# Patient Record
Sex: MALE | Race: WHITE | NOT HISPANIC OR LATINO | Employment: FULL TIME | URBAN - METROPOLITAN AREA
[De-identification: names, ages, dates, MRNs, and addresses within clinical notes are randomized per-mention and may not be internally consistent; named-entity substitution may affect disease eponyms.]

---

## 2019-05-09 ENCOUNTER — OFFICE VISIT (OUTPATIENT)
Dept: MEDICAL GROUP | Facility: PHYSICIAN GROUP | Age: 59
End: 2019-05-09
Payer: COMMERCIAL

## 2019-05-09 VITALS
DIASTOLIC BLOOD PRESSURE: 70 MMHG | SYSTOLIC BLOOD PRESSURE: 120 MMHG | HEIGHT: 68 IN | TEMPERATURE: 98.1 F | BODY MASS INDEX: 24.86 KG/M2 | OXYGEN SATURATION: 96 % | WEIGHT: 164 LBS | HEART RATE: 56 BPM

## 2019-05-09 DIAGNOSIS — M17.11 PRIMARY OSTEOARTHRITIS OF RIGHT KNEE: ICD-10-CM

## 2019-05-09 PROCEDURE — 20610 DRAIN/INJ JOINT/BURSA W/O US: CPT | Performed by: FAMILY MEDICINE

## 2019-05-09 RX ORDER — TRIAMCINOLONE ACETONIDE 40 MG/ML
80 INJECTION, SUSPENSION INTRA-ARTICULAR; INTRAMUSCULAR ONCE
Status: COMPLETED | OUTPATIENT
Start: 2019-05-09 | End: 2019-05-09

## 2019-05-09 RX ADMIN — TRIAMCINOLONE ACETONIDE 80 MG: 40 INJECTION, SUSPENSION INTRA-ARTICULAR; INTRAMUSCULAR at 17:24

## 2019-05-09 ASSESSMENT — PATIENT HEALTH QUESTIONNAIRE - PHQ9: CLINICAL INTERPRETATION OF PHQ2 SCORE: 0

## 2019-05-09 ASSESSMENT — PAIN SCALES - GENERAL: PAINLEVEL: 8=MODERATE-SEVERE PAIN

## 2019-05-09 NOTE — LETTER
[facility]  Mac Avila M.D.  [address]  [place] [address]  Fax: [phone]   Authorization for Release/Disclosure of   Protected Health Information   Name: VANCE PÉREZ : 1960 SSN: [id number]   Address: [address]  [place] [address] Phone:    [phone] (home)    I authorize the entity listed below to release/disclose the PHI below to:   [facility]/Mac Avila M.D. and Mac Avila M.D.   Provider or Entity Name:   Address   City, State, Zip   Phone:      Fax:     Reason for request: continuity of care   Information to be released:    [  ] LAST COLONOSCOPY,  including any PATH REPORT and follow-up  [  ] LAST FIT/COLOGUARD RESULT [  ] LAST DEXA  [  ] LAST MAMMOGRAM  [  ] LAST PAP  [  ] LAST LABS [  ] RETINA EXAM REPORT  [  ] IMMUNIZATION RECORDS  [  ] Release all info      [  ] Check here and initial the line next to each item to release ALL health information INCLUDING  _____ Care and treatment for drug and / or alcohol abuse  _____ HIV testing, infection status, or AIDS  _____ Genetic Testing    DATES OF SERVICE OR TIME PERIOD TO BE DISCLOSED: _____________  I understand and acknowledge that:  * This Authorization may be revoked at any time by you in writing, except if your health information has already been used or disclosed.  * Your health information that will be used or disclosed as a result of you signing this authorization could be re-disclosed by the recipient. If this occurs, your re-disclosed health information may no longer be protected by State or Federal laws.  * You may refuse to sign this Authorization. Your refusal will not affect your ability to obtain treatment.  * This Authorization becomes effective upon signing and will  on (date) __________.      If no date is indicated, this Authorization will  one (1) year from the signature date.    Name: Vance Pérez    Signature:   Date:     2019       PLEASE FAX REQUESTED RECORDS BACK TO:  [phone]
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
## 2019-09-13 ENCOUNTER — OFFICE VISIT (OUTPATIENT)
Dept: MEDICAL GROUP | Facility: PHYSICIAN GROUP | Age: 59
End: 2019-09-13
Payer: COMMERCIAL

## 2019-09-13 ENCOUNTER — HOSPITAL ENCOUNTER (OUTPATIENT)
Dept: RADIOLOGY | Facility: MEDICAL CENTER | Age: 59
End: 2019-09-13
Attending: FAMILY MEDICINE
Payer: COMMERCIAL

## 2019-09-13 VITALS
SYSTOLIC BLOOD PRESSURE: 128 MMHG | BODY MASS INDEX: 24.55 KG/M2 | OXYGEN SATURATION: 99 % | DIASTOLIC BLOOD PRESSURE: 70 MMHG | HEART RATE: 61 BPM | WEIGHT: 162 LBS | RESPIRATION RATE: 14 BRPM | TEMPERATURE: 98 F | HEIGHT: 68 IN

## 2019-09-13 DIAGNOSIS — M25.511 ACUTE PAIN OF RIGHT SHOULDER: ICD-10-CM

## 2019-09-13 PROCEDURE — 73030 X-RAY EXAM OF SHOULDER: CPT | Mod: RT

## 2019-09-13 PROCEDURE — 99214 OFFICE O/P EST MOD 30 MIN: CPT | Performed by: FAMILY MEDICINE
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
## 2019-09-25 ENCOUNTER — HOSPITAL ENCOUNTER (OUTPATIENT)
Dept: RADIOLOGY | Facility: MEDICAL CENTER | Age: 59
End: 2019-09-25
Attending: FAMILY MEDICINE
Payer: COMMERCIAL

## 2019-09-25 DIAGNOSIS — M25.511 ACUTE PAIN OF RIGHT SHOULDER: ICD-10-CM

## 2019-09-25 PROCEDURE — 73221 MRI JOINT UPR EXTREM W/O DYE: CPT | Mod: RT

## 2019-09-26 ENCOUNTER — TELEPHONE (OUTPATIENT)
Dept: MEDICAL GROUP | Facility: PHYSICIAN GROUP | Age: 59
End: 2019-09-26

## 2019-09-26 DIAGNOSIS — S43.431A TEAR OF RIGHT GLENOID LABRUM, INITIAL ENCOUNTER: ICD-10-CM

## 2019-09-26 DIAGNOSIS — S43.432A TEAR OF LEFT GLENOID LABRUM, INITIAL ENCOUNTER: ICD-10-CM

## 2019-09-26 DIAGNOSIS — M12.811 ROTATOR CUFF TEAR ARTHROPATHY OF RIGHT SHOULDER: ICD-10-CM

## 2019-09-26 DIAGNOSIS — M75.101 ROTATOR CUFF TEAR ARTHROPATHY OF RIGHT SHOULDER: ICD-10-CM

## 2020-04-10 ENCOUNTER — APPOINTMENT (OUTPATIENT)
Dept: RADIOLOGY | Facility: IMAGING CENTER | Age: 60
End: 2020-04-10
Attending: NURSE PRACTITIONER
Payer: COMMERCIAL

## 2020-04-10 ENCOUNTER — OFFICE VISIT (OUTPATIENT)
Dept: URGENT CARE | Facility: CLINIC | Age: 60
End: 2020-04-10
Payer: COMMERCIAL

## 2020-04-10 VITALS
OXYGEN SATURATION: 98 % | DIASTOLIC BLOOD PRESSURE: 76 MMHG | HEART RATE: 59 BPM | BODY MASS INDEX: 24.25 KG/M2 | SYSTOLIC BLOOD PRESSURE: 138 MMHG | TEMPERATURE: 98.2 F | RESPIRATION RATE: 20 BRPM | WEIGHT: 160 LBS | HEIGHT: 68 IN

## 2020-04-10 DIAGNOSIS — S22.41XA CLOSED FRACTURE OF MULTIPLE RIBS OF RIGHT SIDE, INITIAL ENCOUNTER: ICD-10-CM
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
## 2020-04-10 DIAGNOSIS — M25.532 WRIST PAIN, ACUTE, LEFT: ICD-10-CM

## 2020-04-10 DIAGNOSIS — R07.81 RIB PAIN: ICD-10-CM

## 2020-04-10 DIAGNOSIS — M79.621 PAIN OF RIGHT UPPER ARM: ICD-10-CM

## 2020-04-10 DIAGNOSIS — S52.502A CLOSED FRACTURE OF DISTAL END OF LEFT RADIUS, UNSPECIFIED FRACTURE MORPHOLOGY, INITIAL ENCOUNTER: ICD-10-CM

## 2020-04-10 PROCEDURE — 71101 X-RAY EXAM UNILAT RIBS/CHEST: CPT | Mod: TC,FY,RT | Performed by: NURSE PRACTITIONER

## 2020-04-10 PROCEDURE — 99215 OFFICE O/P EST HI 40 MIN: CPT | Performed by: NURSE PRACTITIONER

## 2020-04-10 PROCEDURE — 73060 X-RAY EXAM OF HUMERUS: CPT | Mod: TC,FY,RT | Performed by: NURSE PRACTITIONER

## 2020-04-10 PROCEDURE — 73110 X-RAY EXAM OF WRIST: CPT | Mod: TC,FY,LT | Performed by: NURSE PRACTITIONER

## 2020-04-10 RX ORDER — HYDROCODONE BITARTRATE AND ACETAMINOPHEN 5; 325 MG/1; MG/1
1-2 TABLET ORAL EVERY 6 HOURS PRN
Qty: 20 TAB | Refills: 0 | Status: SHIPPED | OUTPATIENT
Start: 2020-04-10 | End: 2020-04-14

## 2020-04-10 ASSESSMENT — ENCOUNTER SYMPTOMS
ORTHOPNEA: 0
WEAKNESS: 0
HEADACHES: 0
EYE PAIN: 0
CHILLS: 0
BACK PAIN: 0
ABDOMINAL PAIN: 0
BRUISES/BLEEDS EASILY: 0
DIZZINESS: 0
WEIGHT LOSS: 0
DIAPHORESIS: 0
FEVER: 0
PALPITATIONS: 0
TINGLING: 0
LOSS OF CONSCIOUSNESS: 0
NECK PAIN: 0
DOUBLE VISION: 0
MYALGIAS: 0
VOMITING: 0
FOCAL WEAKNESS: 0
EYE REDNESS: 0
SPUTUM PRODUCTION: 0
NAUSEA: 0
BLURRED VISION: 0
SENSORY CHANGE: 0
FALLS: 1
SHORTNESS OF BREATH: 0
COUGH: 0

## 2020-04-10 ASSESSMENT — LIFESTYLE VARIABLES: SUBSTANCE_ABUSE: 0
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
## 2020-04-10 NOTE — PATIENT INSTRUCTIONS
Rib Fracture  A rib fracture is a break or crack in one of the bones of the ribs. The ribs are a group of long, curved bones that wrap around your chest and attach to your spine. They protect your lungs and other organs in the chest cavity. A broken or cracked rib is often painful, but most do not cause other problems. Most rib fractures heal on their own over time. However, rib fractures can be more serious if multiple ribs are broken or if broken ribs move out of place and push against other structures.  What are the causes?  · A direct blow to the chest. For example, this could happen during contact sports, a car accident, or a fall against a hard object.  · Repetitive movements with high force, such as pitching a baseball or having severe coughing spells.  What are the signs or symptoms?  · Pain when you breathe in or cough.  · Pain when someone presses on the injured area.  How is this diagnosed?  Your caregiver will perform a physical exam. Various imaging tests may be ordered to confirm the diagnosis and to look for related injuries. These tests may include a chest X-ray, computed tomography (CT), magnetic resonance imaging (MRI), or a bone scan.  How is this treated?  Rib fractures usually heal on their own in 1-3 months. The longer healing period is often associated with a continued cough or other aggravating activities. During the healing period, pain control is very important. Medication is usually given to control pain. Hospitalization or surgery may be needed for more severe injuries, such as those in which multiple ribs are broken or the ribs have moved out of place.  Follow these instructions at home:  · Avoid strenuous activity and any activities or movements that cause pain. Be careful during activities and avoid bumping the injured rib.  · Gradually increase activity as directed by your caregiver.  · Only take over-the-counter or prescription medications as directed by your caregiver. Do not take  other medications without asking your caregiver first.  · Apply ice to the injured area for the first 1-2 days after you have been treated or as directed by your caregiver. Applying ice helps to reduce inflammation and pain.  ¨ Put ice in a plastic bag.  ¨ Place a towel between your skin and the bag.  ¨ Leave the ice on for 15-20 minutes at a time, every 2 hours while you are awake.  · Perform deep breathing as directed by your caregiver. This will help prevent pneumonia, which is a common complication of a broken rib. Your caregiver may instruct you to:  ¨ Take deep breaths several times a day.  ¨ Try to cough several times a day, holding a pillow against the injured area.  ¨ Use a device called an incentive spirometer to practice deep breathing several times a day.  · Drink enough fluids to keep your urine clear or pale yellow. This will help you avoid constipation.  · Do not wear a rib belt or binder. These restrict breathing, which can lead to pneumonia.  Get help right away if:  · You have a fever.  · You have difficulty breathing or shortness of breath.  · You develop a continual cough, or you cough up thick or bloody sputum.  · You feel sick to your stomach (nausea), throw up (vomit), or have abdominal pain.  · You have worsening pain not controlled with medications.  This information is not intended to replace advice given to you by your health care provider. Make sure you discuss any questions you have with your health care provider.  Document Released: 12/18/2006 Document Revised: 05/25/2017 Document Reviewed: 02/19/2014  [employer] Patient Education © 2017 [employer]        Wrist Fracture Treated With Immobilization  Introduction  A wrist fracture is a break or crack in one of the bones of your wrist. Your wrist is made of eight small bones at the palm of your hand (carpal bones) and two long bones that make your forearm (radius and ulna).  A broken wrist is often treated by wearing a cast,  splint, or sling (immobilization). This holds the broken pieces in place so they can heal.  Follow these instructions at home:  If you have a splint:  · Wear the splint as told by your doctor. Remove it only as told by your doctor.  · Loosen the splint if your fingers tingle, get numb, or turn cold and blue.  · Do not let your splint get wet if it is not waterproof.  · Keep the splint clean.  If you have a sling:  · Wear it as told by your doctor. Remove it only as told by your doctor.  If you have a cast:  · Do not stick anything inside the cast to scratch your skin.  · Check the skin around the cast every day. Tell your doctor about any concerns. You may put lotion on dry skin around the edges of the cast. Do not put lotion on the skin underneath the cast.  · Do not let your cast get wet if it is not waterproof.  · Keep the cast clean.  Bathing  · Do not take baths, swim, or use a hot tub until your doctor says that you can. Ask your doctor if you can take showers. You may only be allowed to take sponge baths.  · If your cast or splint is not waterproof, cover it with a watertight plastic bag while you take a bath or a shower. Do not let the cast or splint get wet.  · If you have a sling, remove it for bathing only if your doctor says this is okay.  Managing pain, stiffness, and swelling  · If directed, put ice on the injured area.  ¨ Put ice in a plastic bag.  ¨ Place a towel between your skin and the bag.  ¨ Leave the ice on for 20 minutes, 2-3 times a day.  · Move your fingers often to avoid stiffness and to lessen swelling.  · Raise (elevate) the injured area above the level of your heart while you are sitting or lying down.  Driving  · Do not drive or use heavy machinery while taking prescription pain medicine.  · Ask your doctor when it is safe to drive if you have a cast, splint, or sling on your wrist.  Activity  · Return to your normal activities as told by your doctor. Ask your doctor what activities are  safe for you.  · Do range-of-motion exercises only as told by your doctor.  General instructions  · Do not put pressure on any part of the cast or splint until it is fully hardened. This may take many hours.  · Do not use any tobacco products, such as cigarettes, chewing tobacco, and e-cigarettes. Tobacco can delay bone healing. If you need help quitting, ask your doctor.  · Take over-the-counter and prescription medicines only as told by your doctor.  · Keep all follow-up visits as told by your doctor. This is important.  Contact a doctor if:  · Your cast, splint, or sling is damaged or loose.  · You have any new pain, swelling, or bruising.  · Your pain, swelling, and bruising do not get better.  · You have a fever.  · You have chills.  Get help right away if:  · Your skin or fingers on your injured arm turn blue or gray.  · Your arm feels cold or gets numb.  · You have very bad pain in your injured wrist.  This information is not intended to replace advice given to you by your health care provider. Make sure you discuss any questions you have with your health care provider.  Document Released: 06/05/2009 Document Revised: 05/25/2017 Document Reviewed: 08/31/2016  © 2017 [employer]

## 2020-04-11 ENCOUNTER — TELEMEDICINE (OUTPATIENT)
Dept: URGENT CARE | Facility: MEDICAL CENTER | Age: 60
End: 2020-04-11
Payer: COMMERCIAL

## 2020-04-11 VITALS — TEMPERATURE: 98.7 F

## 2020-04-14 ENCOUNTER — OFFICE VISIT (OUTPATIENT)
Dept: MEDICAL GROUP | Facility: PHYSICIAN GROUP | Age: 60
End: 2020-04-14
Payer: COMMERCIAL

## 2020-04-14 VITALS
SYSTOLIC BLOOD PRESSURE: 142 MMHG | HEIGHT: 68 IN | HEART RATE: 70 BPM | TEMPERATURE: 97.5 F | DIASTOLIC BLOOD PRESSURE: 82 MMHG | WEIGHT: 165 LBS | OXYGEN SATURATION: 98 % | BODY MASS INDEX: 25.01 KG/M2

## 2020-04-14 DIAGNOSIS — S52.502S CLOSED FRACTURE OF DISTAL END OF LEFT RADIUS, UNSPECIFIED FRACTURE MORPHOLOGY, SEQUELA: ICD-10-CM

## 2020-04-14 DIAGNOSIS — S22.43XS CLOSED FRACTURE OF MULTIPLE RIBS OF BOTH SIDES, SEQUELA: ICD-10-CM

## 2020-04-14 PROCEDURE — 99214 OFFICE O/P EST MOD 30 MIN: CPT | Performed by: FAMILY MEDICINE

## 2020-04-14 ASSESSMENT — PATIENT HEALTH QUESTIONNAIRE - PHQ9: CLINICAL INTERPRETATION OF PHQ2 SCORE: 0
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
## 2020-04-30 ENCOUNTER — TELEPHONE (OUTPATIENT)
Dept: MEDICAL GROUP | Facility: PHYSICIAN GROUP | Age: 60
End: 2020-04-30

## 2020-04-30 RX ORDER — CETIRIZINE HYDROCHLORIDE, PSEUDOEPHEDRINE HYDROCHLORIDE 5; 120 MG/1; MG/1
1 TABLET, FILM COATED, EXTENDED RELEASE ORAL 2 TIMES DAILY
Qty: 60 TAB | Refills: 2 | Status: SHIPPED | OUTPATIENT
Start: 2020-04-30 | End: 2021-09-14

## 2020-04-30 NOTE — TELEPHONE ENCOUNTER
"Received request via: Patient    Was the patient seen in the last year in this department? Yes on 04/14/2020    Does the patient have an active prescription (recently filled or refills available) for medication(s) requested? No        Patient states that the pharmacy is only dispensing Cetirizine-Pseudoephedrine if the patient has a prescription.      Prescriptions needs to say \"12 Hours & 60 tablets\"  "

## 2020-06-23 ENCOUNTER — TELEPHONE (OUTPATIENT)
Dept: MEDICAL GROUP | Facility: PHYSICIAN GROUP | Age: 60
End: 2020-06-23

## 2020-06-23 NOTE — TELEPHONE ENCOUNTER
1. Caller Name: Vance Epps                          Call Back Number: [phone] (home)         How would the patient prefer to be contacted with a response: Phone call OK to leave a detailed message    Patient is wondering if he can use Meloxicam to help with his arthritis. He stated that he does not have this medication nor has he tried it. Please advise.

## 2020-06-24 NOTE — TELEPHONE ENCOUNTER
Please notify patient that he would be a good candidate for meloxicam however I would like for him to try over-the-counter naproxen first as this may be longer acting and more beneficial without requiring a prescription.  Please advise him to discontinue ibuprofen if taking naproxen

## 2021-04-07 ENCOUNTER — TELEPHONE (OUTPATIENT)
Dept: MEDICAL GROUP | Facility: PHYSICIAN GROUP | Age: 61
End: 2021-04-07

## 2021-04-07 DIAGNOSIS — Z91.030 BEE ALLERGY STATUS: ICD-10-CM

## 2021-04-07 NOTE — TELEPHONE ENCOUNTER
"Patient is wondering if we can send him an emergency epi pen on hand.    Patient called his insurance and informed him that we can prescribe him an \"Adult Epi-pen kit\" Generic only with any dosage.    Please advise.           "

## 2021-04-08 RX ORDER — EPINEPHRINE 0.3 MG/.3ML
0.3 INJECTION SUBCUTANEOUS ONCE
Qty: 0.3 ML | Refills: 0 | Status: SHIPPED | OUTPATIENT
Start: 2021-04-08 | End: 2021-04-08

## 2021-04-08 NOTE — TELEPHONE ENCOUNTER
Patient is allergic to bee stings. 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

## 2021-04-09 NOTE — TELEPHONE ENCOUNTER
Placed order for EpiPen., per pt request.  1. Bee allergy status  EPINEPHrine (EPIPEN 2-ALIZA) 0.3 MG/0.3ML Solution Auto-injector solution for injection   Marv Coello M.D.. 4/8/2021   (cross-coverage)

## 2021-09-14 ENCOUNTER — OFFICE VISIT (OUTPATIENT)
Dept: MEDICAL GROUP | Facility: PHYSICIAN GROUP | Age: 61
End: 2021-09-14
Payer: COMMERCIAL

## 2021-09-14 VITALS
TEMPERATURE: 98.3 F | OXYGEN SATURATION: 98 % | WEIGHT: 163 LBS | HEART RATE: 51 BPM | BODY MASS INDEX: 26.2 KG/M2 | RESPIRATION RATE: 12 BRPM | DIASTOLIC BLOOD PRESSURE: 78 MMHG | SYSTOLIC BLOOD PRESSURE: 136 MMHG | HEIGHT: 66 IN

## 2021-09-14 DIAGNOSIS — M62.830 SPASM OF BACK MUSCLES: ICD-10-CM

## 2021-09-14 PROCEDURE — 99214 OFFICE O/P EST MOD 30 MIN: CPT | Performed by: FAMILY MEDICINE

## 2021-09-14 RX ORDER — CYCLOBENZAPRINE HCL 10 MG
10 TABLET ORAL 3 TIMES DAILY PRN
Qty: 15 TABLET | Refills: 0 | Status: SHIPPED | OUTPATIENT
Start: 2021-09-14

## 2021-09-14 RX ORDER — MULTIVIT-MIN/IRON/FOLIC ACID/K 18-600-40
CAPSULE ORAL
COMMUNITY

## 2021-09-14 RX ORDER — LIDOCAINE HYDROCHLORIDE 20 MG/ML
5 INJECTION, SOLUTION EPIDURAL; INFILTRATION; INTRACAUDAL; PERINEURAL
OUTPATIENT
Start: 2021-09-14

## 2021-09-14 RX ORDER — CHOLECALCIFEROL (VITAMIN D3) 25 MCG
CAPSULE ORAL
COMMUNITY

## 2021-09-14 ASSESSMENT — ENCOUNTER SYMPTOMS
PERIANAL NUMBNESS: 0
NUMBNESS: 0
BOWEL INCONTINENCE: 0
HEADACHES: 0
WEIGHT LOSS: 0
PARESTHESIAS: 0
BACK PAIN: 1
PARESIS: 0
FEVER: 0

## 2021-09-14 ASSESSMENT — PATIENT HEALTH QUESTIONNAIRE - PHQ9: CLINICAL INTERPRETATION OF PHQ2 SCORE: 0

## 2021-09-14 NOTE — PROGRESS NOTES
"Subjective     Vance Epps is a 61 y.o. male who presents with Back Pain (started in July, lower right has radiated up into his chest  )            Back Pain  This is a chronic problem. The current episode started more than 1 month ago. The problem occurs constantly. The problem has been waxing and waning since onset. The pain is present in the lumbar spine. The pain does not radiate. The pain is moderate. The pain is the same all the time. The symptoms are aggravated by bending, position, standing and stress. Stiffness is present all day. Pertinent negatives include no bladder incontinence, bowel incontinence, fever, headaches, numbness, paresis, paresthesias, perianal numbness or weight loss. He has tried home exercises, analgesics, heat and walking for the symptoms. The treatment provided mild relief.       Review of Systems   Constitutional: Negative for fever and weight loss.   Gastrointestinal: Negative for bowel incontinence.   Genitourinary: Negative for bladder incontinence.   Musculoskeletal: Positive for back pain.   Neurological: Negative for numbness, headaches and paresthesias.              Objective     /78 (BP Location: Left arm, Patient Position: Sitting, BP Cuff Size: Adult)   Pulse (!) 51   Temp 36.8 °C (98.3 °F) (Temporal)   Resp 12   Ht 1.675 m (5' 5.95\")   Wt 73.9 kg (163 lb)   SpO2 98%   BMI 26.35 kg/m²      Physical Exam  Constitutional:       Appearance: Normal appearance.   HENT:      Head: Normocephalic and atraumatic.      Right Ear: External ear normal.      Left Ear: External ear normal.      Nose: Nose normal.      Mouth/Throat:      Mouth: Mucous membranes are moist.   Eyes:      Extraocular Movements: Extraocular movements intact.      Pupils: Pupils are equal, round, and reactive to light.   Cardiovascular:      Rate and Rhythm: Normal rate.   Pulmonary:      Effort: Pulmonary effort is normal. No respiratory distress.   Musculoskeletal:      Cervical back: Normal, " normal range of motion and neck supple.      Thoracic back: Normal.      Lumbar back: Spasms present. No swelling, deformity or bony tenderness. Decreased range of motion. Negative right straight leg raise test and negative left straight leg raise test.        Back:       Comments: Spasm   Neurological:      General: No focal deficit present.      Mental Status: He is alert and oriented to person, place, and time. Mental status is at baseline.      Cranial Nerves: No cranial nerve deficit.      Gait: Gait normal.   Psychiatric:         Mood and Affect: Mood normal.         Behavior: Behavior normal.         Assessment & Plan        1. Spasm of back muscles  New problem to examiner.  5 mL of intramuscular lidocaine provided approximately 50% relief of existing pain today.  Counseled on home physical therapy exercises, NSAIDs.  Prescription of Flexeril sent to pharmacy.  Follow-up in 2 weeks if not improved  - lidocaine PF (XYLOCAINE-MPF) 2 % injection PF 5 mL    Patient with ongoing stress at home and counseling today regarding home life stressors with family dynamics and teenage son.    My total time spent caring for the patient on the day of the encounter was 32 minutes.   This does not include time spent on separately billable procedures/tests.       Please note that this dictation was created using voice recognition software. I have worked with consultants from the vendor as well as technical experts from [facility] to optimize the interface. I have made every reasonable attempt to correct obvious errors, but I expect that there are errors of grammar and possibly content that I did not discover before finalizing the note.

## 2023-04-20 ENCOUNTER — HOSPITAL ENCOUNTER (OUTPATIENT)
Dept: RADIOLOGY | Facility: MEDICAL CENTER | Age: 63
End: 2023-04-20
Attending: FAMILY MEDICINE
Payer: COMMERCIAL

## 2023-04-20 DIAGNOSIS — R07.9 CHEST PAIN, UNSPECIFIED TYPE: ICD-10-CM

## 2023-04-20 PROCEDURE — 71046 X-RAY EXAM CHEST 2 VIEWS: CPT

## 2024-04-13 ENCOUNTER — APPOINTMENT (OUTPATIENT)
Dept: RADIOLOGY | Facility: MEDICAL CENTER | Age: 64
End: 2024-04-13
Attending: EMERGENCY MEDICINE
Payer: COMMERCIAL

## 2024-04-13 ENCOUNTER — HOSPITAL ENCOUNTER (EMERGENCY)
Facility: MEDICAL CENTER | Age: 64
End: 2024-04-13
Attending: EMERGENCY MEDICINE
Payer: COMMERCIAL

## 2024-04-13 VITALS
OXYGEN SATURATION: 98 % | DIASTOLIC BLOOD PRESSURE: 78 MMHG | HEIGHT: 68 IN | HEART RATE: 58 BPM | RESPIRATION RATE: 16 BRPM | TEMPERATURE: 97.6 F | SYSTOLIC BLOOD PRESSURE: 138 MMHG | WEIGHT: 167.55 LBS | BODY MASS INDEX: 25.39 KG/M2

## 2024-04-13 DIAGNOSIS — S12.9XXA CLOSED FRACTURE OF TRANSVERSE PROCESS OF CERVICAL VERTEBRA, INITIAL ENCOUNTER (HCC): ICD-10-CM

## 2024-04-13 DIAGNOSIS — M43.6 TORTICOLLIS: ICD-10-CM

## 2024-04-13 DIAGNOSIS — M54.2 ACUTE NECK PAIN: ICD-10-CM

## 2024-04-13 DIAGNOSIS — S12.201A CLOSED NONDISPLACED FRACTURE OF THIRD CERVICAL VERTEBRA, UNSPECIFIED FRACTURE MORPHOLOGY, INITIAL ENCOUNTER (HCC): ICD-10-CM

## 2024-04-13 DIAGNOSIS — M54.2 NECK PAIN: ICD-10-CM

## 2024-04-13 LAB
ANION GAP SERPL CALC-SCNC: 9 MMOL/L (ref 7–16)
BUN SERPL-MCNC: 26 MG/DL (ref 8–22)
CALCIUM SERPL-MCNC: 9.5 MG/DL (ref 8.4–10.2)
CHLORIDE SERPL-SCNC: 105 MMOL/L (ref 96–112)
CO2 SERPL-SCNC: 25 MMOL/L (ref 20–33)
CREAT SERPL-MCNC: 1.27 MG/DL (ref 0.5–1.4)
EKG IMPRESSION: NORMAL
GFR SERPLBLD CREATININE-BSD FMLA CKD-EPI: 63 ML/MIN/1.73 M 2
GLUCOSE SERPL-MCNC: 91 MG/DL (ref 65–99)
POTASSIUM SERPL-SCNC: 4.5 MMOL/L (ref 3.6–5.5)
SODIUM SERPL-SCNC: 139 MMOL/L (ref 135–145)

## 2024-04-13 PROCEDURE — 72125 CT NECK SPINE W/O DYE: CPT

## 2024-04-13 PROCEDURE — 700102 HCHG RX REV CODE 250 W/ 637 OVERRIDE(OP): Performed by: EMERGENCY MEDICINE

## 2024-04-13 PROCEDURE — 70498 CT ANGIOGRAPHY NECK: CPT

## 2024-04-13 PROCEDURE — 99284 EMERGENCY DEPT VISIT MOD MDM: CPT

## 2024-04-13 PROCEDURE — 93005 ELECTROCARDIOGRAM TRACING: CPT | Performed by: EMERGENCY MEDICINE

## 2024-04-13 PROCEDURE — 700111 HCHG RX REV CODE 636 W/ 250 OVERRIDE (IP): Performed by: EMERGENCY MEDICINE

## 2024-04-13 PROCEDURE — 36415 COLL VENOUS BLD VENIPUNCTURE: CPT

## 2024-04-13 PROCEDURE — A9270 NON-COVERED ITEM OR SERVICE: HCPCS | Performed by: EMERGENCY MEDICINE

## 2024-04-13 PROCEDURE — 96372 THER/PROPH/DIAG INJ SC/IM: CPT

## 2024-04-13 PROCEDURE — 72141 MRI NECK SPINE W/O DYE: CPT

## 2024-04-13 PROCEDURE — 700117 HCHG RX CONTRAST REV CODE 255: Performed by: EMERGENCY MEDICINE

## 2024-04-13 PROCEDURE — 80048 BASIC METABOLIC PNL TOTAL CA: CPT

## 2024-04-13 RX ORDER — OXYCODONE HYDROCHLORIDE 5 MG/1
5 TABLET ORAL ONCE
Status: COMPLETED | OUTPATIENT
Start: 2024-04-13 | End: 2024-04-13

## 2024-04-13 RX ORDER — OXYCODONE HYDROCHLORIDE AND ACETAMINOPHEN 5; 325 MG/1; MG/1
1 TABLET ORAL EVERY 4 HOURS PRN
Qty: 15 TABLET | Refills: 0 | Status: SHIPPED | OUTPATIENT
Start: 2024-04-13 | End: 2024-04-18

## 2024-04-13 RX ORDER — METHYLPREDNISOLONE 4 MG/1
TABLET ORAL
Qty: 21 TABLET | Refills: 0 | Status: SHIPPED | OUTPATIENT
Start: 2024-04-13

## 2024-04-13 RX ORDER — KETOROLAC TROMETHAMINE 15 MG/ML
15 INJECTION, SOLUTION INTRAMUSCULAR; INTRAVENOUS ONCE
Status: COMPLETED | OUTPATIENT
Start: 2024-04-13 | End: 2024-04-13

## 2024-04-13 RX ORDER — OXYCODONE HYDROCHLORIDE 5 MG/1
5 TABLET ORAL EVERY 4 HOURS PRN
Qty: 15 TABLET | Refills: 0 | Status: SHIPPED | OUTPATIENT
Start: 2024-04-13 | End: 2024-04-16

## 2024-04-13 RX ORDER — ACETAMINOPHEN 325 MG/1
650 TABLET ORAL ONCE
Status: COMPLETED | OUTPATIENT
Start: 2024-04-13 | End: 2024-04-13

## 2024-04-13 RX ADMIN — ACETAMINOPHEN 650 MG: 325 TABLET ORAL at 12:35

## 2024-04-13 RX ADMIN — OXYCODONE HYDROCHLORIDE 5 MG: 5 TABLET ORAL at 18:14

## 2024-04-13 RX ADMIN — IOHEXOL 80 ML: 350 INJECTION, SOLUTION INTRAVENOUS at 15:44

## 2024-04-13 RX ADMIN — KETOROLAC TROMETHAMINE 15 MG: 15 INJECTION, SOLUTION INTRAMUSCULAR; INTRAVENOUS at 12:35

## 2024-04-13 RX ADMIN — OXYCODONE HYDROCHLORIDE 5 MG: 5 TABLET ORAL at 13:25

## 2024-04-13 RX ADMIN — PREDNISONE 60 MG: 50 TABLET ORAL at 13:25

## 2024-04-13 ASSESSMENT — PAIN DESCRIPTION - PAIN TYPE: TYPE: ACUTE PAIN

## 2024-04-13 NOTE — ED NOTES
IV started with blood drawn and sent to lab    Pending results.  MRI questionnaire completed.  Patient and family member updated with his plan of care verbalizes understanding

## 2024-04-13 NOTE — ED TRIAGE NOTES
"Chief Complaint   Patient presents with    Neck Pain     Reports he woke up on Thursday with pain in his neck, and states pain is worse with movement. Pt. Feels unable to turn neck \"from side to side\". Reports also associated h/a. Denies vision changes, dizziness, fever, CP, SOB or radiation of this pain.        .Physical Exam  Pulmonary:      Effort: Pulmonary effort is normal.   Skin:     General: Skin is warm and dry.   Neurological:      Mental Status: He is alert.         "

## 2024-04-13 NOTE — ED PROVIDER NOTES
"  ER Provider Note    Scribed for Mikhail Talamantes M.D. by Dina Valdovinos. 4/13/2024   12:06 PM    Primary Care Provider: Mac Avila M.D.    CHIEF COMPLAINT  Chief Complaint   Patient presents with    Neck Pain     Reports he woke up on Thursday with pain in his neck, and states pain is worse with movement. Pt. Feels unable to turn neck \"from side to side\". Reports also associated h/a. Denies vision changes, dizziness, fever, CP, SOB or radiation of this pain.      EXTERNAL RECORDS REVIEWED  No prior prescription history    HPI/ROS    OUTSIDE HISTORIAN(S):  Family    Vance Epps is a 64 y.o. male who presents to the ED for evaluation of left sided neck pain onset 2-3 days ago. He states his pain is worsening and he cannot move his neck right or left. He states he is concerned for meningitis. He denies any trauma. He adds he has a headache, but denies any vomiting or fevers. He denies any recent travel. He has a history of knee arthritis. He adds he has taken muscle relaxers, Tylenol and Ibuprofen with no relief.    PAST MEDICAL HISTORY  Past Medical History:   Diagnosis Date    Arthritis        SURGICAL HISTORY  History reviewed. No pertinent surgical history.    FAMILY HISTORY  None noted    SOCIAL HISTORY   reports that he has never smoked. He has never used smokeless tobacco. He reports that he does not drink alcohol and does not use drugs.    CURRENT MEDICATIONS  Previous Medications    ACETAMINOPHEN (TYLENOL PO)    Take  by mouth as needed.    ASCORBIC ACID (VITAMIN C) 500 MG CAP    Take  by mouth. Takes 2 daily    CHOLECALCIFEROL (VITAMIN D-3) 25 MCG (1000 UT) CAP    Take  by mouth. Pt takes 4 pills daily 4,000 IU    CYCLOBENZAPRINE (FLEXERIL) 10 MG TAB    Take 1 Tablet by mouth 3 times a day as needed for Muscle Spasms.    MAGNESIUM 400 MG CAP    Take  by mouth.    MISC NATURAL PRODUCTS (GLUCOSAMINE CHOND CMP DOUBLE PO)    Take  by mouth.    MULTIPLE VITAMINS-MINERALS (MENS 50+ MULTI VITAMIN/MIN PO)    " "Take  by mouth.    POTASSIUM 99 MG TAB    Take  by mouth.    ZINC 50 MG CAP    Take 50 mg by mouth every day.       ALLERGIES  Allergies   Allergen Reactions    Bee Anaphylaxis     Anaphylaxis per pt, to bee stings.         PHYSICAL EXAM  BP (!) 176/99   Pulse 60   Temp 36.4 °C (97.6 °F) (Temporal)   Resp 18   Ht 1.727 m (5' 8\")   Wt 76 kg (167 lb 8.8 oz)   SpO2 99%   BMI 25.48 kg/m²    Nursing note and vitals reviewed.  Constitutional: Well-developed and well-nourished. No distress.   HENT: Head is normocephalic and atraumatic. Oropharynx is clear and moist without exudate or erythema.   Neck: Tender in the cervical paraspinal musculature bilateral and at the insertion of the occiput, no midline point tenderness.   Eyes: Pupils are equal, round, and reactive to light. Conjunctiva are normal.   Cardiovascular: Normal rate and regular rhythm. No murmur heard. Normal radial pulses.  Pulmonary/Chest: Breath sounds normal. No wheezes or rales.   Abdominal: Soft and non-tender. No distention    Musculoskeletal: Extremities exhibit normal range of motion without edema or tenderness.   Neurological: Awake, alert and oriented to person, place, and time. No focal deficits noted.  Skin: Skin is warm and dry. No rash.   Psychiatric: Normal mood and affect. Appropriate for clinical situation    DIAGNOSTIC STUDIES    Labs: No results found for this or any previous visit.    EKG:   I have independently interpreted this EKG as detailed above.     Radiology:   This attending emergency physician has independently interpreted the diagnostic imaging associated with this visit and is awaiting the final reading from the radiologist.   Preliminary interpretation is a follows:   Radiologist interpretation:   CT-CSPINE WITHOUT PLUS RECONS    (Results Pending)   CT-CTA NECK WITH & W/O-POST PROCESSING    (Results Pending)   MR-CERVICAL SPINE-W/O    (Results Pending)        INITIAL ASSESSMENT AND PLAN    12:06 PM - Patient was " evaluated at bedside for neck pain. Ordered for EKG and CT-Neck to evaluate. The patient will be medicated with Toradol 15 mg and Tylenol 650 mg for his symptoms. Patient verbalizes understanding and support with my plan of care.  Differential diagnoses include but not limited to: Torticollis      COURSE AND MEDICAL DECISION MAKING    Patient presents today with neck pain.  He has a stiff neck with difficulty moving the left or right.  Some also movement difficulty movement and flexion.  I find no clinical evidence of meningitis.  No history of fever.  No infectious symptoms.  He has no history of trauma.  Given his age and no prior similar symptoms and his age I feel that obtaining imaging is appropriate to evaluate for any potential metastatic disease or other etiology of symptoms.  However, feel this is most likely mechanical neck pain.    1:37 PM I spoke with the radiologist.  Radiologist concern for a C3 transverse process fracture.  This gets close to the foramen.  He is recommending a CTA of the neck.  He also recommends an MRI of the cervical spine to further evaluate given the patient does not have any history of trauma.    1:38 PM patient is admitted to the ED observation status at this time pending additional imaging and reevaluation.  My partner Dr. Greenberg will assume care at this time.      DIAGNOSIS  1. Neck pain    2. Torticollis    3. Acute neck pain    4. Closed nondisplaced fracture of third cervical vertebra, unspecified fracture morphology, initial encounter (HCC)         Dina FERNANDEZ (Scribe), am scribing for, and in the presence of, Mikhail Talamantes M.D..    Electronically signed by: Dina Valdovinos ([place]), 4/13/2024    Mikhail FERNANDEZ M.D. personally performed the services described in this documentation, as scribed by Dina Valdovinos in my presence, and it is both accurate and complete.      The note accurately reflects work and decisions made by me.  Mikhail Talamantes M.D.  4/13/2024  1:38 PM

## 2024-04-14 NOTE — DISCHARGE SUMMARY
ED Observation Discharge Summary    Patient:Vance Epps  Patient : 1960  Patient MRN: [id number]  Patient PCP: Pcp Pt States None    Admit Date: 2024  Discharge Date and Time: 24 6:07 PM  Discharge Diagnosis: C3 transverse process fracture.  Discharge Attending: Keith Greenberg M.D.  Discharge Service: ED Observation    ED Course  Vance is a 64 y.o. male who was evaluated at [facility] for neck pain.  The patient presented with a torticollis-like syndrome.  He underwent CT noncontrast that showed a transverse process fracture.  Initially the patient did not recall any antecedent trauma.  Because of the location of the injury the patient underwent CTA of the neck to rule out dissection, CTA is negative.    Because he did not recall any trauma MRI was done to look for signs of a metastatic process.  The MRI showed a acute to subacute transverse process fracture but otherwise unremarkable.  The patient now recalls taking a significant fall when he was skiing about 3 weeks ago.  This could be the cause of the pain.  It is unusual that he has delayed onset of pain, possibly the fracture today is completely unrelated, and such that maybe he had some torticollis and incidentally had a finding of a recent transverse process fracture.    In any case he is well-appearing, neurologically intact, this is a stable fracture, this does not require transfer or neurosurgical intervention.  I can place him on a short course of pain medications as he has significantly improved range of motion here in the ER after receiving oxycodone.    In prescribing controlled substances to this patient, I certify that I have obtained and reviewed the medical history of Vance Epps. I have also made a good yuri effort to obtain applicable records from other providers who have treated the patient and records did not demonstrate any increased risk of substance abuse that would prevent me from prescribing controlled substances.  "    I have conducted a physical exam and documented it. I have reviewed Mr. Pérez’s prescription history as maintained by the [place] Prescription Monitoring Program.     I have assessed the patient’s risk for abuse, dependency, and addiction using the validated Opioid Risk Tool available at https://www.mdcalc.com/unzggf-knzb-zapt-ort-narcotic-abuse.     Given the above, I believe the benefits of controlled substance therapy outweigh the risks. The reasons for prescribing controlled substances include non-narcotic, oral analgesic alternatives have been inadequate for pain control. Accordingly, I have discussed the risk and benefits, treatment plan, and alternative therapies with the patient.       Discharge Exam:  BP (!) 156/83   Pulse (!) 53   Temp 36.4 °C (97.6 °F) (Temporal)   Resp 16   Ht 1.727 m (5' 8\")   Wt 76 kg (167 lb 8.8 oz)   SpO2 97%   BMI 25.48 kg/m² .    Constitutional: Awake and alert. Nontoxic  HENT:  Grossly normal  Eyes: Grossly normal  Neck: Normal range of motion  Cardiovascular: Normal heart rate   Thorax & Lungs: No respiratory distress  Abdomen: Nontender  Skin:  No pathologic rash.   Extremities: Well perfused  Psychiatric: Affect normal    Labs  Results for orders placed or performed during the hospital encounter of 04/13/24   Basic Metabolic Panel   Result Value Ref Range    Sodium 139 135 - 145 mmol/L    Potassium 4.5 3.6 - 5.5 mmol/L    Chloride 105 96 - 112 mmol/L    Co2 25 20 - 33 mmol/L    Glucose 91 65 - 99 mg/dL    Bun 26 (H) 8 - 22 mg/dL    Creatinine 1.27 0.50 - 1.40 mg/dL    Calcium 9.5 8.4 - 10.2 mg/dL    Anion Gap 9.0 7.0 - 16.0   ESTIMATED GFR   Result Value Ref Range    GFR (CKD-EPI) 63 >60 mL/min/1.73 m 2   EKG   Result Value Ref Range    Report       [facility]    Test Date:  2024-04-13  Pt Name:    ANDREW PÉREZ                Department: [facility]  MRN:        [id number]                      Room:  Gender:     Male                    "      Technician: [id number]  :        1960                   Requested By:ER TRIAGE PROTOCOL  Order #:    [id number]                    Reading MD:    Measurements  Intervals                                Axis  Rate:       54                           P:          75  DC:         169                          QRS:        67  QRSD:       103                          T:          35  QT:         445  QTc:        422    Interpretive Statements  Sinus rhythm  Atrial premature complex  No previous ECG available for comparison         Radiology  MR-CERVICAL SPINE-W/O   Final Result      1.  Edema of the left C3 transverse process consistent with acute or subacute fracture as seen on CT.   2.  No other evidence of acute traumatic injury of the cervical spine.   3.  Multilevel degenerative changes as above, greatest at C5-C6.      CT-CTA NECK WITH & W/O-POST PROCESSING   Final Result      CT angiogram of the neck within normal limits. No stenosis, occlusion or dissection of the neck arteries.      Subacute to chronic appearing left C3 transverse process fracture again noted.      CT-CSPINE WITHOUT PLUS RECONS   Final Result      1.  Subacute appearing left C3 transverse process fracture involving the left transverse foramen. CT angiogram of the neck may be performed if concern for vertebral artery injury.   2.  No definite acute fracture or dislocation of the cervical spine.   3.  Multilevel moderate disc degeneration.          Medications:   New Prescriptions    METHYLPREDNISOLONE (MEDROL) 4 MG TAB    Take as per the package instructions.    OXYCODONE IMMEDIATE-RELEASE (ROXICODONE) 5 MG TAB    Take 1 Tablet by mouth every four hours as needed for Severe Pain for up to 3 days.    OXYCODONE-ACETAMINOPHEN (PERCOCET) 5-325 MG TAB    Take 1 Tablet by mouth every four hours as needed for Moderate Pain or Severe Pain for up to 5 days.       My final assessment includes transverse process fracture  Upon Reevaluation, the  patient's condition has: Improved; and will be discharged.    Patient discharged from ED Observation status at 4/13/2024 6:09 PM     Total time spent on this ED Observation discharge encounter is < 30 Minutes    Electronically signed by: Keith Greenberg M.D., 4/13/2024 6:07 PM

## 2024-04-14 NOTE — ED NOTES
Medicated as ordered.    IV discontinued with cathlon intact    Discharge home with instructions, follow up care and rxn reviewed and given to patient with verbal understanding.      Ambulatory with a steady gait and stable condition

## 2025-02-18 ENCOUNTER — HOSPITAL ENCOUNTER (OUTPATIENT)
Dept: LAB | Facility: MEDICAL CENTER | Age: 65
End: 2025-02-18
Attending: FAMILY MEDICINE
Payer: MEDICAID

## 2025-02-18 PROCEDURE — 80053 COMPREHEN METABOLIC PANEL: CPT

## 2025-02-18 PROCEDURE — 36415 COLL VENOUS BLD VENIPUNCTURE: CPT

## 2025-02-18 PROCEDURE — 80061 LIPID PANEL: CPT

## 2025-02-18 PROCEDURE — 84153 ASSAY OF PSA TOTAL: CPT

## 2025-02-19 LAB
ALBUMIN SERPL BCP-MCNC: 4.2 G/DL (ref 3.2–4.9)
ALBUMIN/GLOB SERPL: 1.4 G/DL
ALP SERPL-CCNC: 80 U/L (ref 30–99)
ALT SERPL-CCNC: 31 U/L (ref 2–50)
ANION GAP SERPL CALC-SCNC: 12 MMOL/L (ref 7–16)
AST SERPL-CCNC: 25 U/L (ref 12–45)
BILIRUB SERPL-MCNC: 0.5 MG/DL (ref 0.1–1.5)
BUN SERPL-MCNC: 26 MG/DL (ref 8–22)
CALCIUM ALBUM COR SERPL-MCNC: 9.7 MG/DL (ref 8.5–10.5)
CALCIUM SERPL-MCNC: 9.9 MG/DL (ref 8.5–10.5)
CHLORIDE SERPL-SCNC: 104 MMOL/L (ref 96–112)
CHOLEST SERPL-MCNC: 242 MG/DL (ref 100–199)
CO2 SERPL-SCNC: 23 MMOL/L (ref 20–33)
CREAT SERPL-MCNC: 1.27 MG/DL (ref 0.5–1.4)
FASTING STATUS PATIENT QL REPORTED: NORMAL
GFR SERPLBLD CREATININE-BSD FMLA CKD-EPI: 63 ML/MIN/1.73 M 2
GLOBULIN SER CALC-MCNC: 3 G/DL (ref 1.9–3.5)
GLUCOSE SERPL-MCNC: 83 MG/DL (ref 65–99)
HDLC SERPL-MCNC: 73 MG/DL
LDLC SERPL CALC-MCNC: 146 MG/DL
POTASSIUM SERPL-SCNC: 4.6 MMOL/L (ref 3.6–5.5)
PROT SERPL-MCNC: 7.2 G/DL (ref 6–8.2)
SODIUM SERPL-SCNC: 139 MMOL/L (ref 135–145)
TRIGL SERPL-MCNC: 113 MG/DL (ref 0–149)

## 2025-02-20 LAB — PSA SERPL DL<=0.01 NG/ML-MCNC: 0.39 NG/ML (ref 0–4)
